# Patient Record
Sex: MALE | Race: WHITE | NOT HISPANIC OR LATINO | Employment: FULL TIME | URBAN - METROPOLITAN AREA
[De-identification: names, ages, dates, MRNs, and addresses within clinical notes are randomized per-mention and may not be internally consistent; named-entity substitution may affect disease eponyms.]

---

## 2018-08-25 ENCOUNTER — OFFICE VISIT (OUTPATIENT)
Dept: URGENT CARE | Facility: PHYSICIAN GROUP | Age: 28
End: 2018-08-25

## 2018-08-25 VITALS
HEIGHT: 68 IN | OXYGEN SATURATION: 97 % | TEMPERATURE: 97.6 F | DIASTOLIC BLOOD PRESSURE: 90 MMHG | SYSTOLIC BLOOD PRESSURE: 132 MMHG | HEART RATE: 72 BPM | BODY MASS INDEX: 23.64 KG/M2 | WEIGHT: 156 LBS

## 2018-08-25 DIAGNOSIS — L70.0 CYSTIC ACNE: ICD-10-CM

## 2018-08-25 DIAGNOSIS — L02.91 ABSCESS: ICD-10-CM

## 2018-08-25 PROCEDURE — 99204 OFFICE O/P NEW MOD 45 MIN: CPT | Performed by: FAMILY MEDICINE

## 2018-08-25 RX ORDER — DOXYCYCLINE HYCLATE 100 MG
100 TABLET ORAL 2 TIMES DAILY
Qty: 20 TAB | Refills: 0 | Status: SHIPPED | OUTPATIENT
Start: 2018-08-25 | End: 2018-09-04

## 2018-08-25 ASSESSMENT — ENCOUNTER SYMPTOMS
NECK PAIN: 0
FEVER: 0
DIZZINESS: 0
EYE PAIN: 0
VOMITING: 0
WEAKNESS: 0
NAUSEA: 0
CHILLS: 0
SORE THROAT: 0
NUMBNESS: 0
SHORTNESS OF BREATH: 0
MYALGIAS: 0

## 2018-08-25 NOTE — PROGRESS NOTES
"  Subjective:     Omer Vogel is a 28 y.o. male who presents for Abscess (sore/boil on top of scalp, x 2 weeks. Severe sores covering back x few years)       Abscess   This is a new problem. The current episode started in the past 7 days. The problem occurs constantly. The problem has been gradually worsening. Associated symptoms include a rash. Pertinent negatives include no chest pain, chills, fever, myalgias, nausea, neck pain, numbness, sore throat, vomiting or weakness.   History reviewed. No pertinent past medical history.History reviewed. No pertinent surgical history.  Social History     Social History   • Marital status: Single     Spouse name: N/A   • Number of children: N/A   • Years of education: N/A     Occupational History   • Not on file.     Social History Main Topics   • Smoking status: Former Smoker     Quit date: 8/22/2018   • Smokeless tobacco: Never Used   • Alcohol use 0.6 oz/week     1 Cans of beer per week   • Drug use: No   • Sexual activity: Not on file     Other Topics Concern   • Not on file     Social History Narrative   • No narrative on file      Family History   Problem Relation Age of Onset   • Cancer Mother    • Cancer Father    • Heart Disease Neg Hx    • Stroke Neg Hx     Review of Systems   Constitutional: Negative for chills and fever.   HENT: Negative for sore throat.    Eyes: Negative for pain.   Respiratory: Negative for shortness of breath.    Cardiovascular: Negative for chest pain.   Gastrointestinal: Negative for nausea and vomiting.   Genitourinary: Negative for hematuria.   Musculoskeletal: Negative for myalgias and neck pain.   Skin: Positive for rash.   Neurological: Negative for dizziness, weakness and numbness.   No Known Allergies   Objective:   /90   Pulse 72   Temp 36.4 °C (97.6 °F)   Ht 1.727 m (5' 8\")   Wt 70.8 kg (156 lb)   SpO2 97%   BMI 23.72 kg/m²   Physical Exam   Constitutional: He is oriented to person, place, and time. He appears " well-developed and well-nourished. No distress.   HENT:   Head: Normocephalic and atraumatic.   Eyes: Pupils are equal, round, and reactive to light. Conjunctivae and EOM are normal.   Cardiovascular: Normal rate and regular rhythm.    No murmur heard.  Pulmonary/Chest: Effort normal and breath sounds normal. No respiratory distress.   Abdominal: Soft. He exhibits no distension. There is no tenderness.   Neurological: He is alert and oriented to person, place, and time. He has normal reflexes. No sensory deficit.   Skin: Skin is warm and dry. Rash (Cystic on back) noted.        Psychiatric: He has a normal mood and affect.         Assessment/Plan:   Assessment    1. Abscess  - doxycycline (VIBRAMYCIN) 100 MG Tab; Take 1 Tab by mouth 2 times a day for 10 days.  Dispense: 20 Tab; Refill: 0    2. Cystic acne  - doxycycline (VIBRAMYCIN) 100 MG Tab; Take 1 Tab by mouth 2 times a day for 10 days.  Dispense: 20 Tab; Refill: 0    Differential diagnosis, natural history, supportive care, and indications for immediate follow-up discussed.

## 2021-07-08 PROCEDURE — 99283 EMERGENCY DEPT VISIT LOW MDM: CPT

## 2021-07-09 ENCOUNTER — HOSPITAL ENCOUNTER (EMERGENCY)
Facility: MEDICAL CENTER | Age: 31
End: 2021-07-09
Attending: EMERGENCY MEDICINE | Admitting: EMERGENCY MEDICINE
Payer: COMMERCIAL

## 2021-07-09 VITALS
SYSTOLIC BLOOD PRESSURE: 128 MMHG | HEIGHT: 67 IN | TEMPERATURE: 99.6 F | DIASTOLIC BLOOD PRESSURE: 80 MMHG | WEIGHT: 174.6 LBS | OXYGEN SATURATION: 97 % | BODY MASS INDEX: 27.4 KG/M2 | HEART RATE: 100 BPM | RESPIRATION RATE: 14 BRPM

## 2021-07-09 DIAGNOSIS — Z20.822 SUSPECTED COVID-19 VIRUS INFECTION: ICD-10-CM

## 2021-07-09 DIAGNOSIS — B34.9 VIRAL SYNDROME: ICD-10-CM

## 2021-07-09 LAB
SARS-COV-2 RNA RESP QL NAA+PROBE: NOTDETECTED
SPECIMEN SOURCE: NORMAL

## 2021-07-09 PROCEDURE — U0005 INFEC AGEN DETEC AMPLI PROBE: HCPCS

## 2021-07-09 PROCEDURE — U0003 INFECTIOUS AGENT DETECTION BY NUCLEIC ACID (DNA OR RNA); SEVERE ACUTE RESPIRATORY SYNDROME CORONAVIRUS 2 (SARS-COV-2) (CORONAVIRUS DISEASE [COVID-19]), AMPLIFIED PROBE TECHNIQUE, MAKING USE OF HIGH THROUGHPUT TECHNOLOGIES AS DESCRIBED BY CMS-2020-01-R: HCPCS

## 2021-07-09 NOTE — ED NOTES
Pt discharged home per provider in stable condition. Paperwork provided to pt via RN and discharge instructions went over with by RN - pt verbalized understanding of teaching with no questions or concerns and is A/Ox4, VSS. Paperwork in hand on discharge.    Pt verbalized to treat self like positive. Verbalized will activate mychart to get results.

## 2021-07-09 NOTE — ED NOTES
Patient called multiple times, not in lounge, not in senior lounge, not in lobby bathroom, will call again in 15 minutes.

## 2021-07-09 NOTE — ED NOTES
Pt presents to ED for evaluation of COVID test - pt reports that they have been having a fever at home for the past several days, along with HA and generaliZed body aches. Pt denies any known COVID exposure but indicates significant other at home has a compromised immune system and pt has a baby at home.     Pt was notified to treat as if they are positive and to isolate till the result is made aware to them. RN indicated that the result of test will be posted to [employer] which pt will have to activate. Pt indicated that they understood to treat themselves as if positive and verbalized understanding that the result of COVID test will be uploaded to [employer].

## 2021-07-09 NOTE — ED TRIAGE NOTES
Chief Complaint   Patient presents with   • Fever     Pt complains of fever, headaches, and body aches since 8 pm tonight. Requesting Covid test, unvaccinated        Pt amb to triage with steady gait for above complaint.   Pt is alert and oriented, speaking in full sentences, follows commands and responds appropriately to questions. Resp are even and unlabored. No obvious acute distress.    Pt placed in senior lounge. Pt educated on triage process. Pt encouraged to alert staff for any changes.    Vitals:    07/08/21 2222   BP: 142/82   Pulse: 97   Resp: 14   Temp: 37.6 °C (99.6 °F)   SpO2: 96%

## 2021-07-09 NOTE — DISCHARGE INSTRUCTIONS
Your test results:  You have been tested for COVID-19 today. Your results are pending. Please act as if these results are positive and self isolate until you receive the results. Make sure you still wear a mask, social distance and practice good hand hygiene no matterthe result. In order to receive the results you will need to log into your mychart on the [facility] website. If you do not have an account you can create an account. You can login or create an account for [facility] at [email or web address].  Quarantine Criteria:  If your COVID test is positive self isolation at home is required.  Per the CDC guidelines, you must quarantine at home until the following conditions have been met:  It has been 10 days since the onset of symptoms  You have been fever free for 24 hours  Your symptoms are improving.     Self Care:  You need to get plenty of rest.   You should take Tylenol as needed for fever and body aches  Drink plenty of fluids and have good nutrition  Take over-the-counter immune supplements including: Vitamin C 500 mg twice a day, Zinc 75 mg daily, Vitamin D3 1000 U daily and melotonin 0.3 - 1 mg at night to help you sleep.      Community Care:  If you have no choice and have to go out to get medications or food, please wear a mask and wash your hands frequently.    Please tell everyone you know to wear a mask when in public to help decrease transmission of the virus.  Per CDC guidelines, you are not required to provide a healthcare provider’s note to validate your illness or to return to work, as healthcare provider offices and medical facilities may be extremely busy and not able to provide such documentation in a timely way.    Return to the ER Precautions:  Return to the ED if the is any significant shortness of breath, worsening symptoms, not improving as expected or you develop any concerning symptoms.   If your symptoms worsen to a point that you become so short of breath that you can only walk very short  distances prior to fatigue or feel you were unable to manage your symptoms at home please return to the emergency department. For a more objective approach you can buy a pulse oximeter online. [employer] has multiple to choose from. If your oxygen saturation in these devices is persistently below 90% please return to the ER.

## 2021-07-09 NOTE — ED PROVIDER NOTES
"ER Provider Note     Scribed for Magdy Valencia M.D. by Bety cShuster. 2021, 4:49 AM.    Primary Care Provider: Pcp Pt States None  Means of Arrival: Walk-in   History obtained from: Patient  History limited by: None     CHIEF COMPLAINT  Chief Complaint   Patient presents with    Fever     Pt complains of fever, headaches, and body aches since 8 pm tonight. Requesting Covid test, unvaccinated        HPI  Omer Vogel is a 31 y.o. male who presents to the Emergency Department for evaluation of flu like symptoms onset yesterday. He has had a fever, headaches, and body aches. He has not had a cough or chest pain. He has not yet gotten his COVID vaccine as his wife is undergoing chemo. No known sick exposure.     REVIEW OF SYSTEMS  See HPI for further details. All other systems are negative.     PAST MEDICAL HISTORY       SURGICAL HISTORY  patient denies any surgical history    SOCIAL HISTORY  Social History     Tobacco Use    Smoking status: Current Every Day Smoker     Last attempt to quit: 2018     Years since quittin.8    Smokeless tobacco: Never Used   Substance Use Topics    Alcohol use: Yes     Alcohol/week: 0.6 oz     Types: 1 Cans of beer per week    Drug use: No      Social History     Substance and Sexual Activity   Drug Use No       FAMILY HISTORY  Family History   Problem Relation Age of Onset    Cancer Mother     Cancer Father     Heart Disease Neg Hx     Stroke Neg Hx        CURRENT MEDICATIONS  Home Medications       Reviewed by Cora Collazo R.N. (Registered Nurse) on 21 at 2230  Med List Status: <None>     Medication Last Dose Status        Patient Rogelio Taking any Medications                           ALLERGIES  No Known Allergies    PHYSICAL EXAM  VITAL SIGNS: /80   Pulse 100   Temp 37.6 °C (99.6 °F) (Temporal)   Resp 14   Ht 1.702 m (5' 7\")   Wt 79.2 kg (174 lb 9.7 oz)   SpO2 97%   BMI 27.35 kg/m²      Constitutional: Alert in no apparent distress.  HENT: " No signs of trauma, Bilateral external ears normal, Nose normal.   Eyes: Pupils are equal and reactive, Conjunctiva normal, Non-icteric.   Neck: Normal range of motion, No tenderness, Supple, No stridor.   Lymphatic: No lymphadenopathy noted.   Cardiovascular: Regular rate and rhythm, no palpable thrill  Thorax & Lungs: No respiratory distress,  No chest tenderness.   Abdomen: Bowel sounds normal, Soft, No tenderness, No masses, No pulsatile masses. No peritoneal signs.  Skin: Warm, Dry, No erythema, No rash.   Back: No bony tenderness, No CVA tenderness.   Extremities: Intact distal pulses, No edema, No tenderness, No cyanosis.  Musculoskeletal: Good range of motion in all major joints. No tenderness to palpation or major deformities noted.   Neurologic: Alert , Normal motor function, Normal sensory function, No focal deficits noted.   Psychiatric: Affect normal, Judgment normal, Mood normal.     DIAGNOSTIC STUDIES / PROCEDURES    LABS  Labs Reviewed   SARS-COV-2, PCR (IN-HOUSE)    Narrative:     Have you been in close contact with a person who is suspected  or known to be positive for COVID-19 within the last 30 days  (e.g. last seen that person < 30 days ago)->Unknown     All labs reviewed by me.    COURSE & MEDICAL DECISION MAKING  Pertinent Labs & Imaging studies reviewed. (See chart for details)    This is a 31 y.o. male that presents with nonspecific viral symptoms. The patient is not vaccinated against COVID. This could be COVID. This could be another viral syndrome. There is no evidence of bacterial infection at this time. We will test the patient for COVID and discharge home..     4:49 AM - Patient seen and examined at bedside. Ordered Sars-COV2. Discussed discharge instructions and return precautions with the patient and they were cleared for discharge. Patient was given the opportunity to ask any further questions. He is comfortable with discharge at this time.      The patient will return for new or  worsening symptoms and is stable at the time of discharge.    The patient is referred to a primary physician for blood pressure management, diabetic screening, and for all other preventative health concerns.    DISPOSITION:  Patient will be discharged home in stable condition.    FOLLOW UP:  [address] [id number] [phone]  Go in 2 days      OUTPATIENT MEDICATIONS:  New Prescriptions    No medications on file       FINAL IMPRESSION  1. Viral syndrome    2. Suspected COVID-19 virus infection          Bety ORDONEZ (Sophie), am scribing for, and in the presence of, Magdy Valencia M.D..    Electronically signed by: Bety Schuster (Sophie), 7/9/2021    Magdy ORDONEZ M.D. personally performed the services described in this documentation, as scribed by Bety Schuster in my presence, and it is both accurate and complete.     The note accurately reflects work and decisions made by me.  Magdy Valencia M.D.  7/9/2021  6:36 AM

## 2024-06-26 ENCOUNTER — OFFICE VISIT (OUTPATIENT)
Dept: URGENT CARE | Facility: CLINIC | Age: 34
End: 2024-06-26
Payer: COMMERCIAL

## 2024-06-26 VITALS
DIASTOLIC BLOOD PRESSURE: 72 MMHG | HEIGHT: 67 IN | RESPIRATION RATE: 15 BRPM | TEMPERATURE: 97.3 F | HEART RATE: 84 BPM | SYSTOLIC BLOOD PRESSURE: 130 MMHG | OXYGEN SATURATION: 97 % | WEIGHT: 162 LBS | BODY MASS INDEX: 25.43 KG/M2

## 2024-06-26 DIAGNOSIS — H66.001 NON-RECURRENT ACUTE SUPPURATIVE OTITIS MEDIA OF RIGHT EAR WITHOUT SPONTANEOUS RUPTURE OF TYMPANIC MEMBRANE: ICD-10-CM

## 2024-06-26 DIAGNOSIS — Z72.0 TOBACCO USER: ICD-10-CM

## 2024-06-26 RX ORDER — AMOXICILLIN 500 MG/1
500 CAPSULE ORAL 2 TIMES DAILY
Qty: 14 CAPSULE | Refills: 0 | Status: SHIPPED | OUTPATIENT
Start: 2024-06-26 | End: 2024-07-03

## 2024-06-26 NOTE — PROGRESS NOTES
"  Subjective:      34 y.o. male presents to urgent care for right ear pain and decreased hearing that started a couple of days ago. No other cold symptoms such as fever, headache, or body ache.  He smokes an average of 0.5 packs of cigarettes per day.  He has a history of childhood asthma, but has not needed medication for years. He is vaccinated against COVID.  No known sick contacts.    He denies any other questions or concerns at this time.    Current problem list, medication, and past medical/surgical history were reviewed in Epic.    ROS  See HPI     Objective:      /72 (BP Location: Left arm, Patient Position: Sitting, BP Cuff Size: Adult)   Pulse 84   Temp 36.3 °C (97.3 °F) (Temporal)   Resp 15   Ht 1.702 m (5' 7\") Comment: PT reported  Wt 73.5 kg (162 lb)   SpO2 97%   BMI 25.37 kg/m²     Physical Exam  Constitutional:       General: He is not in acute distress.     Appearance: He is not diaphoretic.   HENT:      Right Ear: Ear canal and external ear normal. Tympanic membrane is erythematous and bulging.      Left Ear: Tympanic membrane, ear canal and external ear normal.      Mouth/Throat:      Tongue: Tongue does not deviate from midline.      Palate: No lesions.      Pharynx: Uvula midline. No oropharyngeal exudate or posterior oropharyngeal erythema.      Tonsils: No tonsillar exudate.   Cardiovascular:      Rate and Rhythm: Normal rate and regular rhythm.      Heart sounds: Normal heart sounds.   Pulmonary:      Effort: Pulmonary effort is normal. No respiratory distress.      Breath sounds: Normal breath sounds.   Neurological:      Mental Status: He is alert.   Psychiatric:         Mood and Affect: Affect normal.         Judgment: Judgment normal.       Assessment/Plan:     1. Non-recurrent acute suppurative otitis media of right ear without spontaneous rupture of tympanic membrane  Prescription for amoxicillin has been sent.  Tylenol and ibuprofen as needed for symptomatic relief.  - " amoxicillin (AMOXIL) 500 MG Cap; Take 1 Capsule by mouth 2 times a day for 7 days.  Dispense: 14 Capsule; Refill: 0    2. Tobacco user  4 minutes was spent in educating patient of health risks associated with tobacco, nicotine, and vaping. Verbalized understanding. He has not been successful in quitting in the past. Declines further information or assistance at this time.         Instructed to return to Urgent Care or nearest Emergency Department if symptoms fail to improve, for any change in condition, further concerns, or new concerning symptoms. Patient states understanding of the plan of care and discharge instructions.    Alix Katz M.D.

## 2025-06-10 ENCOUNTER — OFFICE VISIT (OUTPATIENT)
Dept: URGENT CARE | Facility: CLINIC | Age: 35
End: 2025-06-10

## 2025-06-10 VITALS
WEIGHT: 156 LBS | TEMPERATURE: 97.8 F | BODY MASS INDEX: 24.48 KG/M2 | DIASTOLIC BLOOD PRESSURE: 62 MMHG | HEIGHT: 67 IN | SYSTOLIC BLOOD PRESSURE: 116 MMHG | OXYGEN SATURATION: 98 % | RESPIRATION RATE: 14 BRPM

## 2025-06-10 DIAGNOSIS — Z02.1 PRE-EMPLOYMENT EXAMINATION: Primary | ICD-10-CM

## 2025-06-10 PROCEDURE — 94375 RESPIRATORY FLOW VOLUME LOOP: CPT | Performed by: NURSE PRACTITIONER

## 2025-06-10 PROCEDURE — 8916 PR CLEARANCE ONLY: Performed by: NURSE PRACTITIONER

## 2025-06-10 PROCEDURE — 94010 BREATHING CAPACITY TEST: CPT | Performed by: NURSE PRACTITIONER

## 2025-06-10 NOTE — PROGRESS NOTES
35 y.o. male comes in for respiratory clearance.  No history of asthma or other respiratory history. Denies respiratory symptoms currently. Patient does smoke, cessation discussed. Please see the chart for further information, however normal physical exam, cleared for employment without restrictions. Instructed to wear respirator when necessary.       RAVEN Haq.